# Patient Record
(demographics unavailable — no encounter records)

---

## 2025-05-05 NOTE — PHYSICAL EXAM
[Declined] : declined [FreeTextEntry3] : Right thumbnail with yellow thickening subungual hyperkeratotic debris  noted horizontal depressions in other fingernails  erythema, fissuring and cracking of hands bilaterally

## 2025-05-05 NOTE — ASSESSMENT
[FreeTextEntry1] : #Nail dystrophy c/f onychomycosis, R thumbnail - new dx of uncertain prognosis  #Beau's lines, multiple fingernails Discussed differential diagnosis with clinical suspicion for onychomycosis on the right 1st thumbnail  Noted beau's lines on other fingernails, clinically without evidence of fungal infection, possible 2/2 prior infection Discussed nature, chronicity and unpredictable course Discussed options for management and work-up including nail clipping & culture to confirm suspicion of fungus PLAN  -- f/u nail clipping for PAS R thumbnail -- f/u nail culture R thumbnail -- START Ciclopirox 8% solution daily to aa nail, wipe with alcohol pad then repeat after 7 days   #Hand dermatitis 2/2 irritant vs eczematous - Chronic, flaring  Discussed nature, chronicity and unpredictable course PLAN  -- Start Amlactin 12% cream daily to AA -- Start triamcinolone 0.1% ointment BID to the affected area prn roughness/itch. Do not apply to face/groin. Do not use for more than 2 weeks at a time, with 1 week off in between. Side effects discussed with pt including but not limited to thinning of the skin, atrophy and dyspigmentation.  RTC 6 months

## 2025-05-05 NOTE — HISTORY OF PRESENT ILLNESS
[FreeTextEntry1] : nail fungus [de-identified] : Mr. ROMINA FAITH is a 66 year old M here for evaluation of below   Problem: nail change concern for fungus  Location: mainly R. thumbnail but other nails affected as well Duration: Since Feb 2025 (3 months) Associated symptoms/Aggravating Factors: none Modifying factors/Treatments: none    Derm Hx: benign cysts of scalp excised with Dr. Todd Personal hx of skin cancer: None

## 2025-05-05 NOTE — HISTORY OF PRESENT ILLNESS
[FreeTextEntry1] : nail fungus [de-identified] : Mr. ROMINA FAITH is a 66 year old M here for evaluation of below   Problem: nail change concern for fungus  Location: mainly R. thumbnail but other nails affected as well Duration: Since Feb 2025 (3 months) Associated symptoms/Aggravating Factors: none Modifying factors/Treatments: none    Derm Hx: benign cysts of scalp excised with Dr. Todd Personal hx of skin cancer: None

## 2025-07-02 NOTE — ASSESSMENT
[FreeTextEntry1] : History of colon polyps : Patient is due for a follow-up colonoscopy based on his history of colon polyps and possible family history of colon cancer. However, there is uncertainty regarding the father's diagnosis. - Discussed the importance of colonoscopy screening, especially given the patient's history of polyps and possible family history of colon cancer - Explained the rationale for the 5-year interval based on current guidelines - Encouraged the patient to gather more information about his father's medical history if possible I reviewed the risks and benefits of the colonoscopy and answered the patient's questions. Diet and bowel prep instructions were provided. Medication guidance also provided for the days before and day of procedure.  Patient is aware that should they receive sedation for the procedure, they will need to arrange an escort to take them home. - Advised to continue taking amlodipine on the day of the procedure, but to hold atorvastatin until after the procedure  - Unexplained Bruising : Patient noted to have multiple bruises on arms without clear cause - Advised patient to mention bruising to new primary care physician at next visit  Procedure type: colonoscopy Indication: history of colon polyps and family history of colon cancer (father age 57) Prep type: Suprep  Total time spent caring for the patient today was 40 minutes. This includes the time spent before the visit reviewing the chart, time spent during the visit and time spent after the visit on documentation and coordination of care.

## 2025-07-02 NOTE — PHYSICAL EXAM
[Alert] : alert [Normal Voice/Communication] : normal voice/communication [Healthy Appearing] : healthy appearing [No Acute Distress] : no acute distress [Sclera] : the sclera and conjunctiva were normal [Normal Appearance] : the appearance of the neck was normal [No Neck Mass] : no neck mass was observed [Respiration, Rhythm And Depth] : normal respiratory rhythm and effort [Auscultation Breath Sounds / Voice Sounds] : lungs were clear to auscultation bilaterally [Heart Rate And Rhythm] : heart rate was normal and rhythm regular [Normal S1, S2] : normal S1 and S2 [None] : no edema [Bowel Sounds] : normal bowel sounds [Abdomen Tenderness] : non-tender [No Masses] : no abdominal mass palpated [Abdomen Soft] : soft [] : no hepatosplenomegaly [No CVA Tenderness] : no CVA  tenderness [Oriented To Time, Place, And Person] : oriented to person, place, and time [de-identified] : Ecchymoses on dorsal aspects of both hands and wrists